# Patient Record
Sex: FEMALE | Race: BLACK OR AFRICAN AMERICAN | Employment: FULL TIME | URBAN - METROPOLITAN AREA
[De-identification: names, ages, dates, MRNs, and addresses within clinical notes are randomized per-mention and may not be internally consistent; named-entity substitution may affect disease eponyms.]

---

## 2023-08-03 ENCOUNTER — OFFICE VISIT (OUTPATIENT)
Age: 33
End: 2023-08-03

## 2023-08-03 VITALS
OXYGEN SATURATION: 98 % | HEIGHT: 62 IN | WEIGHT: 178 LBS | DIASTOLIC BLOOD PRESSURE: 87 MMHG | SYSTOLIC BLOOD PRESSURE: 115 MMHG | HEART RATE: 98 BPM | BODY MASS INDEX: 32.76 KG/M2 | TEMPERATURE: 98.1 F

## 2023-08-03 DIAGNOSIS — K08.89 PAIN, DENTAL: ICD-10-CM

## 2023-08-03 DIAGNOSIS — K04.7 DENTAL ABSCESS: Primary | ICD-10-CM

## 2023-08-03 RX ORDER — PENICILLIN V POTASSIUM 500 MG/1
500 TABLET ORAL 4 TIMES DAILY
Qty: 28 TABLET | Refills: 0 | Status: SHIPPED | OUTPATIENT
Start: 2023-08-03 | End: 2023-08-10

## 2023-08-03 RX ORDER — KETOROLAC TROMETHAMINE 30 MG/ML
30 INJECTION, SOLUTION INTRAMUSCULAR; INTRAVENOUS ONCE
Status: COMPLETED | OUTPATIENT
Start: 2023-08-03 | End: 2023-08-03

## 2023-08-03 RX ORDER — DEXAMETHASONE SODIUM PHOSPHATE 4 MG/ML
6 INJECTION, SOLUTION INTRA-ARTICULAR; INTRALESIONAL; INTRAMUSCULAR; INTRAVENOUS; SOFT TISSUE ONCE
Status: COMPLETED | OUTPATIENT
Start: 2023-08-03 | End: 2023-08-03

## 2023-08-03 RX ORDER — KETOROLAC TROMETHAMINE 30 MG/ML
30 INJECTION, SOLUTION INTRAMUSCULAR; INTRAVENOUS ONCE
Qty: 1 ML | Refills: 0
Start: 2023-08-03 | End: 2023-08-03 | Stop reason: CLARIF

## 2023-08-03 RX ORDER — KETOROLAC TROMETHAMINE 10 MG/1
10 TABLET, FILM COATED ORAL EVERY 6 HOURS PRN
Qty: 20 TABLET | Refills: 0 | Status: SHIPPED | OUTPATIENT
Start: 2023-08-03

## 2023-08-03 RX ADMIN — DEXAMETHASONE SODIUM PHOSPHATE 6 MG: 4 INJECTION, SOLUTION INTRA-ARTICULAR; INTRALESIONAL; INTRAMUSCULAR; INTRAVENOUS; SOFT TISSUE at 13:55

## 2023-08-03 RX ADMIN — KETOROLAC TROMETHAMINE 30 MG: 30 INJECTION, SOLUTION INTRAMUSCULAR; INTRAVENOUS at 13:56

## 2023-08-03 ASSESSMENT — ENCOUNTER SYMPTOMS
NAUSEA: 0
VOMITING: 0

## 2023-08-03 NOTE — PATIENT INSTRUCTIONS
New Prescriptions    KETOROLAC (TORADOL) 10 MG TABLET    Take 1 tablet by mouth every 6 hours as needed for Pain    PENICILLIN V POTASSIUM (VEETID) 500 MG TABLET    Take 1 tablet by mouth 4 times daily for 7 days

## 2023-08-03 NOTE — PROGRESS NOTES
Gastrointestinal:  Negative for nausea and vomiting. Skin:  Negative for rash. Neurological:  Negative for dizziness, light-headedness and headaches. Physical Exam  Vitals reviewed. Constitutional:       General: She is not in acute distress. Appearance: She is not toxic-appearing. Comments: Appears to be uncomfortable. HENT:      Head: Normocephalic and atraumatic. Mouth/Throat:      Lips: Pink. Mouth: Mucous membranes are moist.      Dentition: Gingival swelling and dental caries present. Pharynx: Uvula midline. Cardiovascular:      Rate and Rhythm: Normal rate and regular rhythm. Heart sounds: No murmur heard. Pulmonary:      Effort: Pulmonary effort is normal. No respiratory distress. Musculoskeletal:         General: Normal range of motion. Cervical back: Normal range of motion. Tenderness present. No rigidity. Skin:     General: Skin is warm and dry. Neurological:      Mental Status: She is alert. Psychiatric:         Mood and Affect: Affect is tearful. Behavior: Behavior normal.       An electronic signature was used to authenticate this note.     --Fadia Narayan PA-C

## 2024-06-20 ENCOUNTER — OFFICE VISIT (OUTPATIENT)
Age: 34
End: 2024-06-20

## 2024-06-20 VITALS
WEIGHT: 183 LBS | HEART RATE: 82 BPM | OXYGEN SATURATION: 98 % | BODY MASS INDEX: 33.47 KG/M2 | TEMPERATURE: 98.7 F | SYSTOLIC BLOOD PRESSURE: 110 MMHG | DIASTOLIC BLOOD PRESSURE: 66 MMHG

## 2024-06-20 DIAGNOSIS — L03.031 CELLULITIS OF TOE OF RIGHT FOOT: Primary | ICD-10-CM

## 2024-06-20 RX ORDER — CEPHALEXIN 500 MG/1
500 CAPSULE ORAL 3 TIMES DAILY
Qty: 30 CAPSULE | Refills: 0 | Status: SHIPPED | OUTPATIENT
Start: 2024-06-20 | End: 2024-06-30

## 2024-06-20 RX ORDER — CLINDAMYCIN HYDROCHLORIDE 300 MG/1
300 CAPSULE ORAL 4 TIMES DAILY
COMMUNITY
Start: 2024-06-14 | End: 2024-06-24

## 2024-06-20 NOTE — PROGRESS NOTES
Eloina Waite (:  1990) is a 33 y.o. female,Established patient, here for evaluation of the following chief complaint(s):  Ingrown Toenail (Rt ft, great toe.Symptom for two weeks.)      ASSESSMENT/PLAN:    ICD-10-CM    1. Cellulitis of toe of right foot  L03.031 cephALEXin (KEFLEX) 500 MG capsule          Dx Disposition: cellulitis toe  Education and handout provided on diagnosis and management of symptoms.   AVS reviewed with patient. Follow up as needed in UC or with PCP for new or worsening symptoms.   Return if symptoms worsen or fail to improve.    SUBJECTIVE/OBJECTIVE:  Patient presents today with complaints of right great toe pain that started 3 days ago thinks has ingrown toe nail      History provided by:  Patient   used: No        Vitals:    24 1057   BP: 110/66   Site: Left Upper Arm   Pulse: 82   Temp: 98.7 °F (37.1 °C)   TempSrc: Oral   SpO2: 98%   Weight: 83 kg (183 lb)       Review of Systems    Physical Exam  Constitutional:       Appearance: Normal appearance.   HENT:      Head: Normocephalic.      Nose: Nose normal.      Mouth/Throat:      Mouth: Mucous membranes are moist.      Pharynx: Oropharynx is clear.   Cardiovascular:      Rate and Rhythm: Normal rate and regular rhythm.      Heart sounds: Normal heart sounds.   Pulmonary:      Effort: Pulmonary effort is normal.   Musculoskeletal:         General: Normal range of motion.      Cervical back: Normal range of motion and neck supple.        Feet:    Feet:      Comments: Swelling and pus noted to circled area toenail is very short but does not appear ingrown  Skin:     General: Skin is warm and dry.   Neurological:      Mental Status: She is alert and oriented to person, place, and time.   Psychiatric:         Mood and Affect: Mood normal.           An electronic signature was used to authenticate this note.    --Eitan Spivey, APRN - CNP

## 2024-06-20 NOTE — PATIENT INSTRUCTIONS
Thank you for allowing us to care for you today and we hope you feel better soon  Warm soapy soaks several times daily  New Prescriptions    CEPHALEXIN (KEFLEX) 500 MG CAPSULE    Take 1 capsule by mouth 3 times daily for 10 days

## 2025-03-08 ENCOUNTER — OFFICE VISIT (OUTPATIENT)
Age: 35
End: 2025-03-08

## 2025-03-08 VITALS
BODY MASS INDEX: 33.8 KG/M2 | SYSTOLIC BLOOD PRESSURE: 108 MMHG | WEIGHT: 184.8 LBS | DIASTOLIC BLOOD PRESSURE: 72 MMHG | RESPIRATION RATE: 18 BRPM | TEMPERATURE: 98 F | OXYGEN SATURATION: 97 % | HEART RATE: 86 BPM

## 2025-03-08 DIAGNOSIS — K04.7 DENTAL ABSCESS: Primary | ICD-10-CM

## 2025-03-08 RX ORDER — KETOROLAC TROMETHAMINE 10 MG/1
10 TABLET, FILM COATED ORAL EVERY 6 HOURS PRN
Qty: 20 TABLET | Refills: 0 | Status: SHIPPED | OUTPATIENT
Start: 2025-03-08 | End: 2026-03-08

## 2025-03-08 RX ORDER — PENICILLIN V POTASSIUM 500 MG/1
500 TABLET, FILM COATED ORAL 4 TIMES DAILY
Qty: 40 TABLET | Refills: 0 | Status: SHIPPED | OUTPATIENT
Start: 2025-03-08 | End: 2025-03-18

## 2025-03-08 NOTE — PATIENT INSTRUCTIONS
Thank you for allowing us to care for you today and we hope you feel better soon  See dentist ASAP  New Prescriptions    KETOROLAC (TORADOL) 10 MG TABLET    Take 1 tablet by mouth every 6 hours as needed for Pain    PENICILLIN V POTASSIUM (VEETID) 500 MG TABLET    Take 1 tablet by mouth 4 times daily for 10 days

## 2025-03-08 NOTE — PROGRESS NOTES
Eloina Waite (:  1990) is a 34 y.o. female,Established patient, here for evaluation of the following chief complaint(s):  Dental Pain (Pt c/o left upper tooth pain from injury that happened last week. )      ASSESSMENT/PLAN:    ICD-10-CM    1. Dental abscess  K04.7 penicillin v potassium (VEETID) 500 MG tablet     ketorolac (TORADOL) 10 MG tablet          Dx Disposition:dental abscess   Education and handout provided on diagnosis and management of symptoms.   AVS reviewed with patient. Follow up as needed in UC or with PCP for new or worsening symptoms.   Return if symptoms worsen or fail to improve.    SUBJECTIVE/OBJECTIVE:  Patient presents today with complaints of left upper tooth pain that started a week ago      History provided by:  Patient   used: No    Dental Pain         Vitals:    25 1431   BP: 108/72   BP Site: Left Upper Arm   Patient Position: Sitting   Pulse: 86   Resp: 18   Temp: 98 °F (36.7 °C)   TempSrc: Oral   SpO2: 97%   Weight: 83.8 kg (184 lb 12.8 oz)       Review of Systems    Physical Exam  Constitutional:       Appearance: Normal appearance.   HENT:      Head: Normocephalic.      Nose: Nose normal.      Mouth/Throat:      Mouth: Mucous membranes are moist.      Pharynx: Oropharynx is clear.        Comments: Chip out of tooth gum inflamed with small abscess noted   Cardiovascular:      Rate and Rhythm: Normal rate and regular rhythm.      Heart sounds: Normal heart sounds.   Pulmonary:      Effort: Pulmonary effort is normal.      Breath sounds: Normal breath sounds.   Musculoskeletal:         General: Normal range of motion.   Skin:     General: Skin is warm and dry.   Neurological:      General: No focal deficit present.      Mental Status: She is oriented to person, place, and time.   Psychiatric:         Mood and Affect: Mood normal.         Behavior: Behavior normal.           An electronic signature was used to authenticate this note.    --LAURY Beebe

## 2025-04-12 ENCOUNTER — OFFICE VISIT (OUTPATIENT)
Age: 35
End: 2025-04-12

## 2025-04-12 VITALS
OXYGEN SATURATION: 98 % | DIASTOLIC BLOOD PRESSURE: 82 MMHG | WEIGHT: 183 LBS | SYSTOLIC BLOOD PRESSURE: 114 MMHG | BODY MASS INDEX: 33.47 KG/M2 | HEART RATE: 89 BPM

## 2025-04-12 DIAGNOSIS — K08.89 PAIN, DENTAL: Primary | ICD-10-CM

## 2025-04-12 RX ORDER — PENICILLIN V POTASSIUM 500 MG/1
500 TABLET, FILM COATED ORAL 4 TIMES DAILY
Qty: 28 TABLET | Refills: 0 | Status: SHIPPED | OUTPATIENT
Start: 2025-04-12 | End: 2025-04-19

## 2025-04-12 RX ORDER — PENICILLIN V POTASSIUM 500 MG/1
500 TABLET, FILM COATED ORAL 4 TIMES DAILY
Qty: 28 TABLET | Refills: 0 | Status: SHIPPED | OUTPATIENT
Start: 2025-04-12 | End: 2025-04-12

## 2025-04-12 RX ORDER — KETOROLAC TROMETHAMINE 10 MG/1
10 TABLET, FILM COATED ORAL EVERY 6 HOURS PRN
Qty: 20 TABLET | Refills: 0 | Status: SHIPPED | OUTPATIENT
Start: 2025-04-12 | End: 2026-04-12

## 2025-04-12 ASSESSMENT — ENCOUNTER SYMPTOMS
CHEST TIGHTNESS: 0
VOMITING: 0
ABDOMINAL PAIN: 0
SHORTNESS OF BREATH: 0
CONSTIPATION: 0
DIARRHEA: 0
COUGH: 0
NAUSEA: 0

## 2025-04-12 NOTE — PROGRESS NOTES
edema, uvula midline. Soft palate without swelling.  There is no periapical swelling or pus expression.  Eyes:      Extraocular Movements: Extraocular movements intact.      Conjunctiva/sclera: Conjunctivae normal.   Cardiovascular:      Rate and Rhythm: Normal rate and regular rhythm.      Pulses: Normal pulses.      Heart sounds: Normal heart sounds.   Pulmonary:      Effort: Pulmonary effort is normal.      Breath sounds: Normal breath sounds. No decreased breath sounds or rhonchi.   Musculoskeletal:      Cervical back: Full passive range of motion without pain, normal range of motion and neck supple. No rigidity.   Skin:     General: Skin is warm.      Capillary Refill: Capillary refill takes less than 2 seconds.   Neurological:      Mental Status: She is alert.   Psychiatric:         Behavior: Behavior is cooperative.           An electronic signature was used to authenticate this note.    --GLADYS Ramos

## 2025-07-03 ENCOUNTER — OFFICE VISIT (OUTPATIENT)
Age: 35
End: 2025-07-03

## 2025-07-03 VITALS
BODY MASS INDEX: 33.31 KG/M2 | OXYGEN SATURATION: 97 % | DIASTOLIC BLOOD PRESSURE: 64 MMHG | TEMPERATURE: 98 F | WEIGHT: 181 LBS | SYSTOLIC BLOOD PRESSURE: 95 MMHG | HEIGHT: 62 IN | HEART RATE: 83 BPM

## 2025-07-03 DIAGNOSIS — K08.89 PAIN, DENTAL: Primary | ICD-10-CM

## 2025-07-03 RX ORDER — ACETAMINOPHEN 325 MG/1
650 TABLET ORAL EVERY 6 HOURS PRN
Qty: 30 TABLET | Refills: 0 | Status: SHIPPED | OUTPATIENT
Start: 2025-07-03

## 2025-07-03 RX ORDER — IBUPROFEN 800 MG/1
800 TABLET, FILM COATED ORAL EVERY 8 HOURS PRN
Qty: 20 TABLET | Refills: 0 | Status: SHIPPED | OUTPATIENT
Start: 2025-07-03

## 2025-07-03 NOTE — PATIENT INSTRUCTIONS
New Prescriptions    ACETAMINOPHEN (AMINOFEN) 325 MG TABLET    Take 2 tablets by mouth every 6 hours as needed for Pain    AMOXICILLIN-CLAVULANATE (AUGMENTIN) 875-125 MG PER TABLET    Take 1 tablet by mouth 2 times daily for 10 days    IBUPROFEN (ADVIL;MOTRIN) 800 MG TABLET    Take 1 tablet by mouth every 8 hours as needed for Pain     -Follow up with a Dentist as soon as possible.  You can try one of the dental clinics listed below.  You can try [facility] [phone]  You can also try  [facility] at  [phone]     -Return here as needed for worsening symptoms or go to the nearest Emergency Department    Dental Emergency Referrals    [facility] ([place] residents only)    [facility]  [address] [phone]   [facility]  [address]  [phone]   [facility]  [address] [place] [address]  [phone]   [facility]  (entrance on [address]. [address]  [address]  [phone]   [facility]  [address]  [phone]   [facility]  [address]  [phone]       [place] Clinics offering Dental Services     [facility]  [address]  [phone]   [facility]  [address]  [phone]     [facility]  [address] 2nd floor  [phone]   [facility]  [address] (10)   [phone]     [facility]  [place]: [address]   [phone]   [facility]  [address]  [phone]     [facility]  [address]  [phone]   [facility] [facility]  [address]  [phone]     [address]  Oral Surgery Dept: [phone]  Dental Clinic: [phone]   [facility]

## 2025-07-03 NOTE — PROGRESS NOTES
Eloina Waite (:  1990) is a 34 y.o. female, Established patient, here for evaluation of the following chief complaint(s):  Dental Pain (Left sided upper tooth pain x couple months)      ASSESSMENT/PLAN:    ICD-10-CM    1. Pain, dental  K08.89           Ddx -  dental pain, dental decay, dental abscess, other  Management Given: augmentin, ibuprofen, tylenol  Given list of dental clinics, ramesh brian, and  OMFS info and instructed to FU  for further management.    Return PRN    SUBJECTIVE/OBJECTIVE:  Patient presents for pain around tooth on the left upper gumline.  Has had episodes of this in the past.  She was on antibiotic few months ago for infection and had appt with a dentist but that dentist stopped taking her insurance.            Vitals:    25 0859   BP: 95/64   BP Site: Right Upper Arm   Patient Position: Sitting   BP Cuff Size: Medium Adult   Pulse: 83   Temp: 98 °F (36.7 °C)   TempSrc: Temporal   SpO2: 97%   Weight: 82.1 kg (181 lb)   Height: 1.575 m (5' 2\")     Review of Systems   HENT:  Positive for dental problem.      Physical Exam  Constitutional:       General: She is not in acute distress.     Appearance: Normal appearance. She is well-developed. She is not ill-appearing, toxic-appearing or diaphoretic.   HENT:      Head: Normocephalic and atraumatic.      Mouth/Throat:      Mouth: Mucous membranes are moist.      Pharynx: Oropharynx is clear. No oropharyngeal exudate or posterior oropharyngeal erythema.      Comments: Tooth #11 with small area that is chipped off.  Mild TTP of the gumline above the tooth but no erythema or edema of the gumline above the tooth.  No fluctuance abscess    No difficulty swallowing or handling secretions.  No signs of airway compromise  Pulmonary:      Effort: Pulmonary effort is normal. No respiratory distress.   Musculoskeletal:         General: Normal range of motion.      Cervical back: Normal range of motion and neck supple.   Neurological:

## 2025-08-29 ENCOUNTER — OFFICE VISIT (OUTPATIENT)
Age: 35
End: 2025-08-29

## 2025-08-29 VITALS
OXYGEN SATURATION: 96 % | HEIGHT: 62 IN | BODY MASS INDEX: 37.54 KG/M2 | WEIGHT: 204 LBS | SYSTOLIC BLOOD PRESSURE: 109 MMHG | DIASTOLIC BLOOD PRESSURE: 79 MMHG | TEMPERATURE: 97.5 F | HEART RATE: 91 BPM

## 2025-08-29 DIAGNOSIS — H00.012 HORDEOLUM EXTERNUM OF RIGHT LOWER EYELID: Primary | ICD-10-CM

## 2025-08-29 RX ORDER — ERYTHROMYCIN 5 MG/G
OINTMENT OPHTHALMIC
Qty: 1 G | Refills: 0 | Status: SHIPPED | OUTPATIENT
Start: 2025-08-29 | End: 2025-09-08

## 2025-08-29 ASSESSMENT — ENCOUNTER SYMPTOMS
ABDOMINAL PAIN: 0
VOMITING: 0
CHEST TIGHTNESS: 0
SHORTNESS OF BREATH: 0
NAUSEA: 0
COUGH: 0
CONSTIPATION: 0
DIARRHEA: 0